# Patient Record
(demographics unavailable — no encounter records)

---

## 2025-01-21 NOTE — PHYSICAL EXAM
[Alert] : alert [No Acute Distress] : no acute distress [Playful] : playful [Normocephalic] : normocephalic [Conjunctivae with no discharge] : conjunctivae with no discharge [PERRL] : PERRL [EOMI Bilateral] : EOMI bilateral [Auricles Well Formed] : auricles well formed [Clear Tympanic membranes with present light reflex and bony landmarks] : clear tympanic membranes with present light reflex and bony landmarks [No Discharge] : no discharge [Nares Patent] : nares patent [Pink Nasal Mucosa] : pink nasal mucosa [Palate Intact] : palate intact [Uvula Midline] : uvula midline [Nonerythematous Oropharynx] : nonerythematous oropharynx [No Caries] : no caries [Trachea Midline] : trachea midline [Supple, full passive range of motion] : supple, full passive range of motion [No Palpable Masses] : no palpable masses [Symmetric Chest Rise] : symmetric chest rise [Clear to Auscultation Bilaterally] : clear to auscultation bilaterally [Normoactive Precordium] : normoactive precordium [Regular Rate and Rhythm] : regular rate and rhythm [Normal S1, S2 present] : normal S1, S2 present [No Murmurs] : no murmurs [+2 Femoral Pulses] : +2 femoral pulses [Soft] : soft [NonTender] : non tender [Non Distended] : non distended [Normoactive Bowel Sounds] : normoactive bowel sounds [No Hepatomegaly] : no hepatomegaly [No Splenomegaly] : no splenomegaly [Sree 1] : Sree 1 [No Clitoromegaly] : no clitoromegaly [Normal Vagina Introitus] : normal vagina introitus [No Abnormal Lymph Nodes Palpated] : no abnormal lymph nodes palpated [Symmetric Buttocks Creases] : symmetric buttocks creases [Symmetric Hip Rotation] : symmetric hip rotation [No Gait Asymmetry] : no gait asymmetry [No pain or deformities with palpation of bone, muscles, joints] : no pain or deformities with palpation of bone, muscles, joints [Normal Muscle Tone] : normal muscle tone [No Spinal Dimple] : no spinal dimple [NoTuft of Hair] : no tuft of hair [Straight] : straight [No Rash or Lesions] : no rash or lesions [FreeTextEntry1] : SLOW TO WARM INITIALLY BUT THEN TALKED AND RESPONDED APPOPRIATELY TO QUESTIONS

## 2025-01-21 NOTE — HISTORY OF PRESENT ILLNESS
[In Pre-K] : In Pre-K [No] : No cigarette smoke exposure [Carbon Monoxide Detectors] : Carbon monoxide detectors [Smoke Detectors] : Smoke detectors [Fruit] : fruit [Vegetables] : vegetables [Meat] : meat [Grains] : grains [Eggs] : eggs [Dairy] : dairy [Normal] : Normal [Toilet Trained] : toilet trained [Brushing teeth] : Brushing teeth [Yes] : Patient goes to dentist yearly [Up to date] : Up to date [NO] : No [Exposure to electronic nicotine delivery system] : No exposure to electronic nicotine delivery system [FreeTextEntry7] : HEARING TEST OUT OF SERVCE TODAY.   [FreeTextEntry8] : PULL UP AT NIGHT [FreeTextEntry9] [facility] QUALIFIED FOR SPEECH AND OT, NO PROVIDER AT SCHOOL RIGHT NOW,  AWAITING CONTRACT FROM [employer] FOR [facility].  "SOFT DIAGNOSIS OF SELCTIVE MUTISM" PER MOM, SLOW TO WARM WITH STRANGERS.  MOM FINDS HER TO BE ANXIOUS, SOMETIMES STUBBORN/DEFIANT.  HX OF SPEECH DELAY, IMPROVED BUT STILL WITH ARTIC ISSUES.  GETS OVERWHELMED/ANXIOUS WITH CERTAIN NOISES

## 2025-01-21 NOTE — DISCUSSION/SUMMARY
[Normal Growth] : growth [Normal Development] : development  [No Elimination Concerns] : elimination [Continue Regimen] : feeding [No Skin Concerns] : skin [Normal Sleep Pattern] : sleep [None] : no medical problems [School Readiness] : school readiness [Healthy Personal Habits] : healthy personal habits [TV/Media] : tv/media [Child and Family Involvement] : child and family involvement [Safety] : safety [Anticipatory Guidance Given] : Anticipatory guidance addressed as per the history of present illness section [No Medications] : ~He/She~ is not on any medications [] : The components of the vaccine(s) to be administered today are listed in the plan of care. The disease(s) for which the vaccine(s) are intended to prevent and the risks have been discussed with the caretaker.  The risks are also included in the appropriate vaccination information statements which have been provided to the patient's caregiver.  The caregiver has given consent to vaccinate. [FreeTextEntry1] : Continue balanced diet with all food groups. Brush teeth twice a day with toothbrush. Recommend visit to dentist. As per car seat 's guidelines, use forward-facing booster seat until child reaches highest weight/height for seat. Child needs to ride in a belt-positioning booster seat until  4 feet 9 inches has been reached and are between 8 and 12 years of age.  Put child to sleep in own bed. Help child to maintain consistent daily routines and sleep schedule. Pre-K discussed. Ensure home is safe. Teach child about personal safety. Use consistent, positive discipline. Read aloud to child. Limit screen time to no more than 2 hours per day.  Vaccine(s) given today: MMR, VARIVAX, QUADRACEL AND INFLUENZA  The potential side effects of today's vaccine(s) and the risks of disease(s) which they are intended to prevent have been discussed with the caretaker.  The caretaker has given consent to vaccinate.

## 2025-01-21 NOTE — PLAN
[TextEntry] : CONTINUE IN-SCHOOL SERVICES AWAIT CENTER BASED SPEECH/OT, IF INITIATION OF SERVICES CONTINUES TO BE PROLONGED, MOTHER TO CALL BACK, WOULD GIVE RX FOR PRIVATES SERVICES THROUGH INSURANCE [employer] [facility]

## 2025-01-21 NOTE — DEVELOPMENTAL MILESTONES
[Goes to the bathroom and has] : goes to bathroom and has bowel movement by self [Dresses and undresses without] : dresses and undresses without much help [Uses 4-word sentences] : uses 4-word sentences [Uses words that are 100%] : uses words that are 100% intelligible to strangers [Climbs stairs, alternating feet] : climbs stairs, alternating feet without support [Draws a person with head and] : draws a person with head and 3 body part [Draws a simple cross] : draws a simple cross [Grasps a pencil with thumb and] : grasps a pencil with thumb and fingers instead of fist [FreeTextEntry1] : NAMES FRIEND KNOWS GENDER, WHOLE NAME, AGE WRITES NAME IDENTIFIES SHAPES AND COLORS

## 2025-02-12 NOTE — PHYSICAL EXAM
[Erythematous Oropharynx] : erythematous oropharynx [NL] : warm, clear [Tired appearing] : not tired appearing [Lethargic] : not lethargic [Enlarged Tonsils] : tonsils not enlarged [Vesicles] : no vesicles [Exudate] : no exudate [Wheezing] : no wheezing [Crackles] : no crackles [Transmitted Upper Airway Sounds] : no transmitted upper airway sounds [Tachypnea] : no tachypnea [Belly Breathing] : no belly breathing

## 2025-02-12 NOTE — HISTORY OF PRESENT ILLNESS
[de-identified] : fever and red spot on back of throat. [FreeTextEntry6] : 4 yr old F presenting for 1 day of symptoms. At home was her normal self. Had decreased energy at school and sent to nurse. Found her oropharynx to be erythematous, and had a temp to 100.2F, sent her home. She denies sore throat, body pain, HA, nausea, etc.

## 2025-02-12 NOTE — DISCUSSION/SUMMARY
[FreeTextEntry1] : 4 year old F with symptoms likely 2/2 start of viral URI. PE and vitals are wnl. Rapid Strep negative.   Recommend supportive care including antipyretics, fluids, and humidifier/warm bath, then nasal saline followed by nasal suction. Education provided for signs of respiratory distress and dehydration. Return if symptoms worsen or persist. F/u throat cx